# Patient Record
Sex: FEMALE | URBAN - METROPOLITAN AREA
[De-identification: names, ages, dates, MRNs, and addresses within clinical notes are randomized per-mention and may not be internally consistent; named-entity substitution may affect disease eponyms.]

---

## 2021-12-20 ENCOUNTER — APPOINTMENT (RX ONLY)
Dept: URBAN - METROPOLITAN AREA CLINIC 120 | Facility: CLINIC | Age: 21
Setting detail: DERMATOLOGY
End: 2021-12-20

## 2021-12-20 DIAGNOSIS — L91.8 OTHER HYPERTROPHIC DISORDERS OF THE SKIN: ICD-10-CM

## 2021-12-20 PROCEDURE — 99202 OFFICE O/P NEW SF 15 MIN: CPT

## 2021-12-20 PROCEDURE — ? TREATMENT REGIMEN

## 2021-12-20 ASSESSMENT — LOCATION SIMPLE DESCRIPTION DERM
LOCATION SIMPLE: LEFT INFERIOR EYELID
LOCATION SIMPLE: LEFT SUPERIOR EYELID

## 2021-12-20 ASSESSMENT — LOCATION DETAILED DESCRIPTION DERM
LOCATION DETAILED: LEFT MEDIAL INFERIOR PRESEPTAL REGION
LOCATION DETAILED: LEFT MEDIAL SUPERIOR EYELID

## 2021-12-20 ASSESSMENT — LOCATION ZONE DERM: LOCATION ZONE: EYELID

## 2021-12-20 NOTE — HPI: SKIN CHECK
What Is The Reason For Today's Visit?: Skin Lesions
Additional History: Pt presents skin tags on left eye would like them removed.